# Patient Record
Sex: FEMALE | Race: AMERICAN INDIAN OR ALASKA NATIVE
[De-identification: names, ages, dates, MRNs, and addresses within clinical notes are randomized per-mention and may not be internally consistent; named-entity substitution may affect disease eponyms.]

---

## 2018-05-20 ENCOUNTER — HOSPITAL ENCOUNTER (EMERGENCY)
Dept: HOSPITAL 5 - ED | Age: 28
LOS: 1 days | Discharge: HOME | End: 2018-05-21
Payer: COMMERCIAL

## 2018-05-20 VITALS — SYSTOLIC BLOOD PRESSURE: 121 MMHG | DIASTOLIC BLOOD PRESSURE: 75 MMHG

## 2018-05-20 DIAGNOSIS — Z88.1: ICD-10-CM

## 2018-05-20 DIAGNOSIS — M65.4: Primary | ICD-10-CM

## 2018-05-20 DIAGNOSIS — M77.9: ICD-10-CM

## 2018-05-20 PROCEDURE — 99282 EMERGENCY DEPT VISIT SF MDM: CPT

## 2018-05-21 NOTE — EMERGENCY DEPARTMENT REPORT
HPI





- General


Chief Complaint: Extremity Problem,Nontraumatic


Time Seen by Provider: 05/21/18 00:55





- HPI


HPI: 





The patient's is a 27 yo female presents for evaluation of pain to her thumb.  

Patient reports left thumb and wrist pain for the past 4 days, 10/10 in severity

, sharp in quality, exacerbated with use of the left thumb or movement of the 

wrist.  The patient denies trauma to the thumb or wrist, fever, chills, night 

sweats, paresthesias, motor deficit.








ED Past Medical Hx





- Past Medical History


Previous Medical History?: No





- Surgical History


Past Surgical History?: No





- Social History


Smoking Status: Never Smoker





- Medications


Home Medications: 


 Home Medications











 Medication  Instructions  Recorded  Confirmed  Last Taken  Type


 


Acetaminophen/Codeine [Tylenol #3] 1 tab PO Q6H PRN #10 tab 05/21/18  Unknown Rx


 


Ibuprofen [Motrin] 600 mg PO Q8H PRN #30 tablet 05/21/18  Unknown Rx














ED Review of Systems


ROS: 


Stated complaint: LT WRIST PAIN


Other details as noted in HPI








Constitutional: denies: fever


ENT: denies: throat or neck pain


Respiratory: denies: cough, shortness of breath


Cardiovascular: denies: chest pain


Endocrine: denies unexplained weight loss or gain


Gastrointestinal: denies: abdominal pain, nausea


Genitourinary: denies: dysuria


Musculoskeletal: reports left thumb pain denies: leg swelling


Skin: denies: rash


Neurological: denies: headache


Hematological/Lymphatic: denies: easy bleeding or easy bruising  


Psych: denies sadness or hopelessness











Physical Exam





- Physical Exam


Vital Signs: 


 Vital Signs











  05/20/18 05/20/18





  21:55 22:38


 


Temperature 98.4 F 98.4 F


 


Pulse Rate 87 80


 


Respiratory 19 18





Rate  


 


Blood Pressure 121/75 121/75


 


O2 Sat by Pulse 99 99





Oximetry  











Physical Exam: 








General: well-nourished, well-developed, no acute distress


Head: Normocephalic, atraumatic


Eyes: normal sclera


ENT: Mucous membranes are pink and moist 


Neck: trachea midline, neck supple, No neck stiffness


Respiratory: Breath sounds equal bilaterally, no wheezing, rales, or rhonchi


Cardio: S1 and S2 present, no murmurs, rubs, gallops, capillary refill is brisk


Musc: Tenderness to palpation present to the left radial styloid, positive 

Finkelstein test, no sensation or motor deficit in the digits of the left hand 

including the thumb, no swelling of the digits, no redness, warmth, fluctuance, 

or crepitus of the left wrist or hand, left wrist extensor function intact


Skin: No rash


Neuro: no facial drooping, normal speech


Psych: Normal affect








ED Course


 Vital Signs











  05/20/18 05/20/18





  21:55 22:38


 


Temperature 98.4 F 98.4 F


 


Pulse Rate 87 80


 


Respiratory 19 18





Rate  


 


Blood Pressure 121/75 121/75


 


O2 Sat by Pulse 99 99





Oximetry  














ED Medical Decision Making





- Medical Decision Making








The patient was seen and examined by myself. On initial evaluation, the patient 

was found to be in no distress, and with physical exam findings consistent with 

de Quervain's tendinitis of the left extensor pollicis longus.  The patient is 

given Motrin for inflammation and a tablet of Tylenol 3 for pain.  The patient 

was offered an x-ray of the wrist and thumb but she declined. The patient was 

reevaluated and reported that their symptoms were markedly improved.  There are 

no signs of flexor tenosynovitis, cellulitis, abscess, septic arthritis, or 

compartment syndrome.  The patient is stable for discharge with outpatient 

follow-up as she has no sensation or motor deficits in the left hand or digits.

  The patient is given follow-up and return instructions.  The patient 

expressed understanding and agreed with the plan.  The patient is discharged in 

stable condition.


Critical care attestation.: 


If time is entered above; I have spent that time in minutes in the direct care 

of this critically ill patient, excluding procedure time.








ED Disposition


Clinical Impression: 


 Left hand tendonitis, Radial styloid tenosynovitis [de quervain]





Disposition: DC-01 TO HOME OR SELFCARE


Is pt being admited?: No


Does the pt Need Aspirin: No


Condition: Stable


Instructions:  Tendinitis (ED), Arthralgia (ED), De Quervain Disease (ED)


Referrals: 


MARITZA BOB MD [Staff Physician] - 3-5 Days


Forms:  Work/School Release Form(ED)


Time of Disposition: 01:30